# Patient Record
Sex: FEMALE | Race: WHITE | Employment: FULL TIME | URBAN - METROPOLITAN AREA
[De-identification: names, ages, dates, MRNs, and addresses within clinical notes are randomized per-mention and may not be internally consistent; named-entity substitution may affect disease eponyms.]

---

## 2020-11-17 ENCOUNTER — HOSPITAL ENCOUNTER (OUTPATIENT)
Age: 40
Setting detail: SPECIMEN
Discharge: HOME | End: 2020-11-17
Payer: MEDICAID

## 2020-11-17 ENCOUNTER — OFFICE VISIT (OUTPATIENT)
Dept: FAMILY MEDICINE CLINIC | Age: 40
End: 2020-11-17
Payer: MEDICAID

## 2020-11-17 VITALS — OXYGEN SATURATION: 100 % | HEART RATE: 100 BPM | TEMPERATURE: 97.1 F

## 2020-11-17 LAB — S PYO AG THROAT QL: NORMAL

## 2020-11-17 PROCEDURE — 87880 STREP A ASSAY W/OPTIC: CPT | Performed by: NURSE PRACTITIONER

## 2020-11-17 PROCEDURE — 99202 OFFICE O/P NEW SF 15 MIN: CPT | Performed by: NURSE PRACTITIONER

## 2020-11-17 ASSESSMENT — ENCOUNTER SYMPTOMS
DIARRHEA: 1
SORE THROAT: 1
RHINORRHEA: 1
COUGH: 1
SHORTNESS OF BREATH: 0
SINUS PRESSURE: 1
VOMITING: 0
NAUSEA: 0

## 2020-11-17 NOTE — PROGRESS NOTES
[address] [facility]  [address]  [facility] [address] [address]  Dept: [phone]  Dept Fax: [phone]    Pratima Gomes is a 41 y.o. female who presents to the urgent care today for her medicalconditions/complaints as noted below. Angel Cervantes is c/o of Cough      HPI:         68-year-old female patient presents with multiple symptoms. Patient reports she has had symptoms for approximately 3 days. Patient describes sore throat, cough, fevers, chills, body aches, fatigue. Reports symptoms have progressed in nature since onset. Reports sore throat scratchy worse with swallowing. Reports mild nasal congestion, rhinorrhea. Reports dry nonproductive cough. Reports diarrhea. Reports generalized fever and chills. Reports generalized fatigue and body aches. Denies chest pain shortness of breath. Treatments tried include over-the-counter regimen. Unknown COVID-19 exposure. .      Past Medical History:   Diagnosis Date    Abnormal Pap smear 2009        Current Outpatient Medications   Medication Sig Dispense Refill    Ascorbic Acid (VITAMIN C) 500 MG tablet Take 500 mg by mouth daily      Omega-3 Fatty Acids (FISH OIL) 1000 MG CAPS Take 1,000 mg by mouth daily.  magnesium (MAGNESIUM-OXIDE) 250 MG TABS tablet Take 250 mg by mouth daily.  b complex vitamins capsule Take 1 capsule by mouth daily.  Vitamin D (CHOLECALCIFEROL) 1000 UNITS CAPS capsule Take 1,000 Units by mouth daily.  Multiple Vitamins-Minerals (MULTIVITAMIN PO) Take 1 tablet by mouth Daily. No current facility-administered medications for this visit. No Known Allergies    Subjective:      Review of Systems   Constitutional: Positive for chills, fatigue and fever. HENT: Positive for congestion, ear pain, rhinorrhea, sinus pressure and sore throat. Negative for postnasal drip. Respiratory: Positive for cough (dry). Negative for shortness of breath. Cardiovascular: Negative for chest pain. Gastrointestinal: Positive for diarrhea. Negative for nausea and vomiting. Musculoskeletal: Positive for myalgias. Neurological: Positive for headaches. All other systems reviewed and are negative. Objective:     Physical Exam  Vitals signs and nursing note reviewed. Constitutional:       General: She is not in acute distress. Appearance: Normal appearance. She is not toxic-appearing. HENT:      Nose: Congestion and rhinorrhea present. Mouth/Throat:      Mouth: Mucous membranes are moist.      Pharynx: Posterior oropharyngeal erythema present. No oropharyngeal exudate. Cardiovascular:      Rate and Rhythm: Normal rate. Pulmonary:      Effort: Pulmonary effort is normal. No respiratory distress. Breath sounds: Normal breath sounds. Skin:     General: Skin is warm and dry. Neurological:      General: No focal deficit present. Mental Status: She is alert and oriented to person, place, and time. Pulse 100   Temp 97.1 °F (36.2 °C)   SpO2 100%   Lab Review   No visits with results within 2 Month(s) from this visit. Latest known visit with results is:   Hospital Outpatient Visit on 11/21/2012   Component Date Value    Specimen 11/21/2012 VAGINAL SWAB     Special Requests 11/21/2012 NOT REPORTED     Direct Exam 11/21/2012 NEGATIVE for Gardnerella vaginalis     Direct Exam 11/21/2012 NEGATIVE for Trichomonas vaginalis     Direct Exam 11/21/2012 NEGATIVE for Candida sp.  Direct Exam 11/21/2012 Method of testing is a DNA probe intended for detection and identification of     Direct Exam 11/21/2012  Candida species, Gardnerella vaginalis, and Trichomonas vaginalis nucleic acid     Direct Exam 11/21/2012  in vaginal fluid specimens from patients with symptoms of vaginitis/vaginosis.     Mikie Schroeder Direct Exam 11/21/2012 [employer] [address] [facility] 3 [phone]     Status 11/21/2012 FINAL 11/21/2012     Specimen 11/21/2012 VAGINAL SPECIMEN     Special Requests 11/21/2012 NOT REPORTED     Culture 11/21/2012 STREPTOCOCCI, BETA HEMOLYTIC GROUP B MODERATE GROWTH*    Culture 11/21/2012 NORMAL URO-GENITAL SURYA     Culture 11/21/2012 NEGATIVE FOR GROUP B STREPTOCOCCI     Culture 11/21/2012 [facility] [address] [facility] Leta Patten [phone]     Status 11/21/2012 FINAL 11/23/2012        Assessment:       Diagnosis Orders   1. Viral illness  POCT rapid strep A    COVID-19 Ambulatory       Plan:      Return if symptoms worsen or fail to improve. No orders of the defined types were placed in this encounter. Results for orders placed or performed in visit on 11/17/20   POCT rapid strep A   Result Value Ref Range    Strep A Ag None Detected None Detected     Recommend isolation pending covid results. Discussed treatment regimen to include rest, hydration, tylenol prn. Discussed deep breathing exercises. Discussed to monitor for progression of symptoms. Follow up as needed. Will contact patient for results       Patient given educational materials - see patient instructions. Discussed use, benefit, and side effects of prescribed medications. All patientquestions answered. Pt voiced understanding. This note was transcribed using dictation with Dragon services. Efforts were made to correct any errors but some words may be misinterpreted.      Electronically signed by MARK Saab CNP on 11/17/2020at 8:39 AM

## 2020-11-21 LAB — SARS-COV-2, NAA: DETECTED
